# Patient Record
Sex: FEMALE | Race: WHITE | NOT HISPANIC OR LATINO | Employment: FULL TIME | URBAN - METROPOLITAN AREA
[De-identification: names, ages, dates, MRNs, and addresses within clinical notes are randomized per-mention and may not be internally consistent; named-entity substitution may affect disease eponyms.]

---

## 2021-03-12 ENCOUNTER — IMMUNIZATION (OUTPATIENT)
Dept: VACCINE CLINIC | Facility: HOSPITAL | Age: 51
End: 2021-03-12

## 2021-03-12 PROCEDURE — 91300 HC SARSCOV02 VAC 30MCG/0.3ML IM: CPT | Performed by: OBSTETRICS & GYNECOLOGY

## 2021-03-12 PROCEDURE — 0001A: CPT | Performed by: OBSTETRICS & GYNECOLOGY

## 2021-04-02 ENCOUNTER — IMMUNIZATION (OUTPATIENT)
Dept: VACCINE CLINIC | Facility: HOSPITAL | Age: 51
End: 2021-04-02

## 2021-04-02 PROCEDURE — 91300 HC SARSCOV02 VAC 30MCG/0.3ML IM: CPT | Performed by: OBSTETRICS & GYNECOLOGY

## 2021-04-02 PROCEDURE — 0002A: CPT | Performed by: OBSTETRICS & GYNECOLOGY

## 2022-10-10 ENCOUNTER — OFFICE VISIT (OUTPATIENT)
Dept: FAMILY MEDICINE CLINIC | Facility: CLINIC | Age: 52
End: 2022-10-10

## 2022-10-10 VITALS
RESPIRATION RATE: 16 BRPM | BODY MASS INDEX: 40.48 KG/M2 | TEMPERATURE: 97.2 F | HEART RATE: 101 BPM | WEIGHT: 220 LBS | HEIGHT: 62 IN | OXYGEN SATURATION: 98 % | DIASTOLIC BLOOD PRESSURE: 97 MMHG | SYSTOLIC BLOOD PRESSURE: 139 MMHG

## 2022-10-10 DIAGNOSIS — R74.8 ELEVATED LIVER ENZYMES: ICD-10-CM

## 2022-10-10 DIAGNOSIS — I10 PRIMARY HYPERTENSION: Primary | ICD-10-CM

## 2022-10-10 DIAGNOSIS — E55.9 VITAMIN D DEFICIENCY: ICD-10-CM

## 2022-10-10 DIAGNOSIS — Z12.31 ENCOUNTER FOR SCREENING MAMMOGRAM FOR MALIGNANT NEOPLASM OF BREAST: ICD-10-CM

## 2022-10-10 DIAGNOSIS — Z23 IMMUNIZATION DUE: ICD-10-CM

## 2022-10-10 DIAGNOSIS — Z11.59 ENCOUNTER FOR HEPATITIS C SCREENING TEST FOR LOW RISK PATIENT: ICD-10-CM

## 2022-10-10 PROCEDURE — 99204 OFFICE O/P NEW MOD 45 MIN: CPT | Performed by: NURSE PRACTITIONER

## 2022-10-10 PROCEDURE — 90686 IIV4 VACC NO PRSV 0.5 ML IM: CPT | Performed by: NURSE PRACTITIONER

## 2022-10-10 PROCEDURE — 90471 IMMUNIZATION ADMIN: CPT | Performed by: NURSE PRACTITIONER

## 2022-10-10 RX ORDER — LORATADINE 10 MG/1
10 TABLET ORAL DAILY
COMMUNITY
End: 2023-03-28

## 2022-10-10 RX ORDER — FLUTICASONE PROPIONATE 50 MCG
1 SPRAY, SUSPENSION (ML) NASAL DAILY
COMMUNITY

## 2022-10-10 RX ORDER — MELATONIN
1000 DAILY
COMMUNITY
End: 2023-01-06

## 2022-10-10 RX ORDER — LOSARTAN POTASSIUM 100 MG/1
100 TABLET ORAL DAILY
Qty: 90 TABLET | Refills: 1 | Status: SHIPPED | OUTPATIENT
Start: 2022-10-10 | End: 2023-03-28 | Stop reason: SDUPTHER

## 2022-10-10 NOTE — PROGRESS NOTES
Immunization  Immunization performed in LD by Joleen Chairez MA. Patient tolerated the procedure well without complications.  10/10/22   Joleen Chairez MA

## 2022-10-10 NOTE — PROGRESS NOTES
"Chief Complaint  Establish Care, Blood pressure , and Anxiety    Subjective          Christin presents to [facility]  as a 52 female to establish care and discuss some elevated blood pressures and increased anxiety.  She has had some elevated blood pressures in the past and was previously on medication several years ago however was able to get off this medication.  She was recently seen at the [facility] for COVID 19.  At that time her anxiety was very high and she did have a severely elevated blood pressure of 180/100 she denies any headaches no blurred vision no dizziness no flushing no chest pain or pressure.  She is agreeable to starting some medication today  .  Christin Llanos female 52 y.o., /97   Pulse 101   Temp 97.2 °F (36.2 °C) (Skin)   Resp 16   Ht 157.5 cm (62\")   Wt 99.8 kg (220 lb)   SpO2 98%   BMI 40.24 kg/m²   who presents today for follow up of Anxiety.  She reports fatigue, anxiety and excessive worry. Onset of symptoms was approximately several years ago.  She denies current suicidal and homicidal ideation. Risk factors are family history of anxiety and or depression, lifestyle of multiple roles, family situation/stress and prior diagnosis of anxiety.  Previous treatment includes medication (SSRI). She complains of the following medication side effects: none. The patient has had no previous counseling..  Most of her anxiety is coming from recent diagnosis of COVID 19.  He does not feel like she needs to restart any medication at this time.  It has improved as her symptoms have dissipated.  She believes she was on an SSRI in the past any medication side effects worked well for the month or 2 that she was on it.  When her stress decrease she was able to wean herself off of this medication and has not been on anything since that time.  She is not currently or previously in counseling.  She will follow-up if she feels like her symptoms are " "increasing    PHQ-2 Depression Screening  Little interest or pleasure in doing things? 0-->not at all   Feeling down, depressed, or hopeless? 0-->not at all   PHQ-2 Total Score 0         She has not had a mammogram and would like to have that ordered today.  Only family history she has is a maternal aunt with a recent diagnosis.  She has no complaints of    She is due for Pap smear-we will schedule that for her next 1 month follow-up visit    She does state that she has had some hypothyroidism in the past.  She quit taking the medication has not recently had any lab work    She is fasting and agreeable to labs today    She has no other significant medical history    She does want a  flu shot office today    The following portions of the patient's history were reviewed and updated as appropriate: allergies, current medications, past family history, past medical history, past social history, past surgical history, and problem list    Review of Systems   Constitutional: Positive for fatigue. Negative for chills and fever.   Eyes: Negative for visual disturbance.   Respiratory: Negative for cough, shortness of breath and wheezing.    Cardiovascular: Negative for chest pain, palpitations and leg swelling.   Gastrointestinal: Negative for abdominal pain, diarrhea, nausea and vomiting.   Skin: Negative for rash.   Neurological: Negative for dizziness and light-headedness.   Psychiatric/Behavioral: Negative for dysphoric mood, self-injury and suicidal ideas. The patient is not nervous/anxious.         Objective   Vital Signs:   Vitals:    10/10/22 0816   BP: 139/97   Pulse: 101   Resp: 16   Temp: 97.2 °F (36.2 °C)   TempSrc: Skin   SpO2: 98%   Weight: 99.8 kg (220 lb)   Height: 157.5 cm (62\")        Class 3 Severe Obesity (BMI >=40). Obesity-related health conditions include the following: none. Obesity is unchanged. BMI is is above average; BMI management plan is completed. We discussed portion control and increasing " exercise.        Physical Exam  Vitals reviewed.   Constitutional:       General: She is not in acute distress.  Eyes:      Conjunctiva/sclera: Conjunctivae normal.   Neck:      Thyroid: No thyromegaly.      Vascular: No carotid bruit.   Cardiovascular:      Rate and Rhythm: Normal rate and regular rhythm.      Heart sounds: Normal heart sounds.   Pulmonary:      Effort: Pulmonary effort is normal.      Breath sounds: Normal breath sounds.   Lymphadenopathy:      Cervical: No cervical adenopathy.   Neurological:      Mental Status: She is alert.   Psychiatric:         Attention and Perception: Attention normal.         Mood and Affect: Mood normal.          Result Review :     The following data was reviewed by: JT Brady on 10/10/2022:  No recent lab work         Assessment and Plan    Diagnoses and all orders for this visit:    1. Primary hypertension (Primary)  Comments:  Start losartan  Monitor blood pressure at home bring log to next visit  Follow-up 1 month  Orders:  -     losartan (COZAAR) 100 MG tablet; Take 1 tablet by mouth Daily.  Dispense: 90 tablet; Refill: 1  -     Comprehensive Metabolic Panel  -     CBC & Differential  -     Lipid Panel  -     TSH  -     T4, Free  -     Vitamin D 25 Hydroxy    2. Encounter for hepatitis C screening test for low risk patient  Comments:  One-time screening  Orders:  -     Hepatitis C Antibody    3. Immunization due  Comments:  Desires influenza vaccine today  No fever  Orders:  -     FluLaval/Fluzone >6 mos ([id number])    4. Encounter for screening mammogram for malignant neoplasm of breast  Comments:  screening-  no previous  no Complaints of    Orders:  -     Mammo Screening Bilateral With CAD; Future        Follow Up   Return in about 1 month (around 11/10/2022) for Annual physical with pap.  Patient was given instructions and counseling regarding her condition or for health maintenance advice. Please see specific information pulled into the AVS if  appropriate.

## 2022-10-11 LAB
25(OH)D3+25(OH)D2 SERPL-MCNC: 15 NG/ML (ref 30–100)
ALBUMIN SERPL-MCNC: 4.1 G/DL (ref 3.5–5.2)
ALBUMIN/GLOB SERPL: 1.5 G/DL
ALP SERPL-CCNC: 167 U/L (ref 39–117)
ALT SERPL-CCNC: 85 U/L (ref 1–33)
AST SERPL-CCNC: 66 U/L (ref 1–32)
BASOPHILS # BLD MANUAL: 0.05 10*3/MM3 (ref 0–0.2)
BASOPHILS NFR BLD MANUAL: 1 % (ref 0–1.5)
BILIRUB SERPL-MCNC: 0.5 MG/DL (ref 0–1.2)
BUN SERPL-MCNC: 5 MG/DL (ref 6–20)
BUN/CREAT SERPL: 7.6 (ref 7–25)
CALCIUM SERPL-MCNC: 9.2 MG/DL (ref 8.6–10.5)
CHLORIDE SERPL-SCNC: 98 MMOL/L (ref 98–107)
CHOLEST SERPL-MCNC: 198 MG/DL (ref 0–200)
CO2 SERPL-SCNC: 31.6 MMOL/L (ref 22–29)
CREAT SERPL-MCNC: 0.66 MG/DL (ref 0.57–1)
DIFFERENTIAL COMMENT: ABNORMAL
EGFRCR SERPLBLD CKD-EPI 2021: 104.4 ML/MIN/1.73
EOSINOPHIL # BLD MANUAL: 0.05 10*3/MM3 (ref 0–0.4)
EOSINOPHIL NFR BLD MANUAL: 1 % (ref 0.3–6.2)
ERYTHROCYTE [DISTWIDTH] IN BLOOD BY AUTOMATED COUNT: 11.7 % (ref 12.3–15.4)
GLOBULIN SER CALC-MCNC: 2.7 GM/DL
GLUCOSE SERPL-MCNC: 86 MG/DL (ref 65–99)
HCT VFR BLD AUTO: 49.9 % (ref 34–46.6)
HDLC SERPL-MCNC: 98 MG/DL (ref 40–60)
HGB BLD-MCNC: 17.4 G/DL (ref 12–15.9)
LDLC SERPL CALC-MCNC: 83 MG/DL (ref 0–100)
LYMPHOCYTES # BLD MANUAL: 0.95 10*3/MM3 (ref 0.7–3.1)
LYMPHOCYTES NFR BLD MANUAL: 18 % (ref 19.6–45.3)
MCH RBC QN AUTO: 37.4 PG (ref 26.6–33)
MCHC RBC AUTO-ENTMCNC: 34.9 G/DL (ref 31.5–35.7)
MCV RBC AUTO: 107.3 FL (ref 79–97)
MONOCYTES # BLD MANUAL: 0.64 10*3/MM3 (ref 0.1–0.9)
MONOCYTES NFR BLD MANUAL: 12 % (ref 5–12)
NEUTROPHILS # BLD MANUAL: 3.6 10*3/MM3 (ref 1.7–7)
NEUTROPHILS NFR BLD MANUAL: 68 % (ref 42.7–76)
NRBC BLD AUTO-RTO: 0 /100 WBC (ref 0–0.2)
PLATELET # BLD AUTO: 192 10*3/MM3 (ref 140–450)
PLATELET BLD QL SMEAR: ABNORMAL
POTASSIUM SERPL-SCNC: 5.5 MMOL/L (ref 3.5–5.2)
PROT SERPL-MCNC: 6.8 G/DL (ref 6–8.5)
RBC # BLD AUTO: 4.65 10*6/MM3 (ref 3.77–5.28)
RBC MORPH BLD: ABNORMAL
SODIUM SERPL-SCNC: 137 MMOL/L (ref 136–145)
T4 FREE SERPL-MCNC: 0.93 NG/DL (ref 0.93–1.7)
TRIGL SERPL-MCNC: 100 MG/DL (ref 0–150)
TSH SERPL DL<=0.005 MIU/L-ACNC: 3.85 UIU/ML (ref 0.27–4.2)
VLDLC SERPL CALC-MCNC: 17 MG/DL (ref 5–40)
WBC # BLD AUTO: 5.3 10*3/MM3 (ref 3.4–10.8)

## 2022-10-12 ENCOUNTER — PATIENT ROUNDING (BHMG ONLY) (OUTPATIENT)
Dept: FAMILY MEDICINE CLINIC | Facility: CLINIC | Age: 52
End: 2022-10-12

## 2022-10-12 NOTE — PROGRESS NOTES
My name is Alanna the practice manager.    I want to officially welcome you to our practice, and ask about your recent visit.    If you could tell me about your recent visit with us.  What things went well?    We are always looking for ways to make our patients' experience even better.  Do you have any recommendations on ways we can improve?    Overall were you satisfied with your first visit with our practice?    I appreciate you taking the time to answer a few questions today.        Thank you, and have a great day!

## 2022-11-10 ENCOUNTER — HOSPITAL ENCOUNTER (OUTPATIENT)
Dept: MAMMOGRAPHY | Facility: HOSPITAL | Age: 52
Discharge: HOME | End: 2022-11-10
Admitting: NURSE PRACTITIONER

## 2022-11-10 ENCOUNTER — OFFICE VISIT (OUTPATIENT)
Dept: FAMILY MEDICINE CLINIC | Facility: CLINIC | Age: 52
End: 2022-11-10

## 2022-11-10 VITALS
OXYGEN SATURATION: 98 % | HEART RATE: 94 BPM | HEIGHT: 62 IN | DIASTOLIC BLOOD PRESSURE: 98 MMHG | WEIGHT: 219 LBS | TEMPERATURE: 97 F | BODY MASS INDEX: 40.3 KG/M2 | SYSTOLIC BLOOD PRESSURE: 160 MMHG | RESPIRATION RATE: 16 BRPM

## 2022-11-10 DIAGNOSIS — I10 PRIMARY HYPERTENSION: ICD-10-CM

## 2022-11-10 DIAGNOSIS — Z12.31 ENCOUNTER FOR SCREENING MAMMOGRAM FOR MALIGNANT NEOPLASM OF BREAST: ICD-10-CM

## 2022-11-10 DIAGNOSIS — Z12.4 ENCOUNTER FOR PAPANICOLAOU SMEAR OF CERVIX: Primary | ICD-10-CM

## 2022-11-10 PROCEDURE — 99214 OFFICE O/P EST MOD 30 MIN: CPT | Performed by: NURSE PRACTITIONER

## 2022-11-10 PROCEDURE — 77067 SCR MAMMO BI INCL CAD: CPT

## 2022-11-10 PROCEDURE — 77063 BREAST TOMOSYNTHESIS BI: CPT

## 2022-11-10 RX ORDER — AMLODIPINE BESYLATE 5 MG/1
5 TABLET ORAL DAILY
Qty: 30 TABLET | Refills: 1 | Status: SHIPPED | OUTPATIENT
Start: 2022-11-10 | End: 2023-01-06

## 2022-11-10 NOTE — PROGRESS NOTES
"Chief Complaint  PAP     Subjective          Christin presents to [facility]  Is a 52-year-old female to follow-up on hypertension and for a annual Pap smear.  She was started on losartan last office visit she has been monitoring her blood pressures regularly.  They have been running 120s to 160s over 80 to 90s.  She denies any headaches no blurred vision no dizziness no flushing no chest pain or pressure.    She is due for a Pap smear.  She has had no vaginal bleeding no pelvic pain or discharge or any other complaints of.  She did have a mammogram done today also    She denies any other complaints of    The following portions of the patient's history were reviewed and updated as appropriate: allergies, current medications, past family history, past medical history, past social history, past surgical history, and problem list    Review of Systems   Constitutional: Negative for chills, fatigue and fever.   Eyes: Negative for visual disturbance.   Respiratory: Negative for cough, shortness of breath and wheezing.    Cardiovascular: Negative for chest pain, palpitations and leg swelling.   Gastrointestinal: Negative for abdominal pain, diarrhea, nausea, rectal pain and vomiting.   Genitourinary: Negative for menstrual problem, vaginal bleeding, vaginal discharge and vaginal pain.   Skin: Negative for rash.   Neurological: Negative for dizziness and light-headedness.        Objective   Vital Signs:   Vitals:    11/10/22 1052   BP: 160/98   Pulse: 94   Resp: 16   Temp: 97 °F (36.1 °C)   TempSrc: Skin   SpO2: 98%   Weight: 99.3 kg (219 lb)   Height: 157.5 cm (62\")                Physical Exam  Vitals reviewed. Exam conducted with a chaperone present.   Constitutional:       General: She is not in acute distress.  Eyes:      Conjunctiva/sclera: Conjunctivae normal.   Neck:      Thyroid: No thyromegaly.      Vascular: No carotid bruit.   Cardiovascular:      Rate and Rhythm: Normal rate and " regular rhythm.      Heart sounds: Normal heart sounds.   Pulmonary:      Effort: Pulmonary effort is normal.      Breath sounds: Normal breath sounds.   Genitourinary:     General: Normal vulva.   Neurological:      Mental Status: She is alert.   Psychiatric:         Attention and Perception: Attention normal.         Mood and Affect: Mood normal.          Result Review :     The following data was reviewed by: JT Brady on 11/10/2022:           Assessment and Plan    Diagnoses and all orders for this visit:    1. Encounter for Papanicolaou smear of cervix (Primary)  -     IGP,CtNg,AptimaHPV,rfx16 / 18,45    2. Primary hypertension  Comments:  continue  losartan  add 5 mg amlodipine  Monitor blood pressure at home bring log to next visit  Follow-up 1 month  Orders:  -     amLODIPine (NORVASC) 5 MG tablet; Take 1 tablet by mouth Daily.  Dispense: 30 tablet; Refill: 1        Follow Up   Return in about 1 month (around 12/10/2022) for Next scheduled follow up b/p check.  Patient was given instructions and counseling regarding her condition or for health maintenance advice. Please see specific information pulled into the AVS if appropriate.

## 2022-11-17 LAB
C TRACH RRNA CVX QL NAA+PROBE: NEGATIVE
CYTOLOGIST CVX/VAG CYTO: NORMAL
CYTOLOGY CVX/VAG DOC CYTO: NORMAL
CYTOLOGY CVX/VAG DOC THIN PREP: NORMAL
DX ICD CODE: NORMAL
HIV 1 & 2 AB SER-IMP: NORMAL
HPV GENOTYPE REFLEX: NORMAL
HPV I/H RISK 4 DNA CVX QL PROBE+SIG AMP: NEGATIVE
N GONORRHOEA RRNA CVX QL NAA+PROBE: NEGATIVE
OTHER STN SPEC: NORMAL
STAT OF ADQ CVX/VAG CYTO-IMP: NORMAL

## 2023-01-06 ENCOUNTER — OFFICE VISIT (OUTPATIENT)
Dept: FAMILY MEDICINE CLINIC | Facility: CLINIC | Age: 53
End: 2023-01-06
Payer: COMMERCIAL

## 2023-01-06 VITALS
WEIGHT: 227.4 LBS | DIASTOLIC BLOOD PRESSURE: 88 MMHG | HEIGHT: 62 IN | OXYGEN SATURATION: 98 % | HEART RATE: 95 BPM | TEMPERATURE: 96.9 F | SYSTOLIC BLOOD PRESSURE: 122 MMHG | BODY MASS INDEX: 41.85 KG/M2

## 2023-01-06 DIAGNOSIS — I10 PRIMARY HYPERTENSION: ICD-10-CM

## 2023-01-06 DIAGNOSIS — F41.9 ANXIETY: Primary | ICD-10-CM

## 2023-01-06 PROCEDURE — 99214 OFFICE O/P EST MOD 30 MIN: CPT | Performed by: NURSE PRACTITIONER

## 2023-01-06 RX ORDER — AMLODIPINE BESYLATE 5 MG/1
TABLET ORAL
Qty: 30 TABLET | Refills: 1 | Status: SHIPPED | OUTPATIENT
Start: 2023-01-06 | End: 2023-03-06

## 2023-01-06 RX ORDER — CITALOPRAM 20 MG/1
20 TABLET ORAL DAILY
Qty: 30 TABLET | Refills: 1 | Status: SHIPPED | OUTPATIENT
Start: 2023-01-06 | End: 2023-03-06

## 2023-01-06 NOTE — PROGRESS NOTES
Chief Complaint  Hypertension (126/95 117/84 132/96) and Anxiety    Subjective          Christin presents to [facility]     As a 52 year old female for follow up on elevated blood pressures and anxiety.    She has a history of a few elevated blood pressures when checking at home.  Has been controlle don 5 mg amlodipine.  No medication side effects.   She has had 120-140s /90s.  She has had a lot of increased stress and anxiety in the past several months with work and family illness.  She denies any increased HA, blurred vision, dizziness, flushing, no chest pain or pressure.    Good in office    Reports depressed mood and anxiety, excessive worry, unable to relax, panic feeling, irritable and sleep disturbance. Onset of symptoms was approximately a few months ago.  She denies current suicidal and homicidal ideation. Risk factors are family history of anxiety and or depression, lifestyle of multiple roles, family situation/stress, job stress, caring for elderly parent and prior diagnosis of anxiety.  Previous treatment includes took clexa for a couple months at the beginning of the pandemic-  worked well, no side effects. She complains of the following medication side effects: none. The patient has had no previous counseling.  PHQ-9 Depression Screening  Little interest or pleasure in doing things? 2-->more than half the days   Feeling down, depressed, or hopeless? 1-->several days   Trouble falling or staying asleep, or sleeping too much? 3-->nearly every day   Feeling tired or having little energy? 1-->several days   Poor appetite or overeating? 0-->not at all   Feeling bad about yourself - or that you are a failure or have let yourself or your family down? 1-->several days   Trouble concentrating on things, such as reading the newspaper or watching television? 1-->several days   Moving or speaking so slowly that other people could have noticed? Or the opposite - being so fidgety or  restless that you have been moving around a lot more than usual? 0-->not at all   Thoughts that you would be better off dead, or of hurting yourself in some way? 0-->not at all   PHQ-9 Total Score 9   If you checked off any problems, how difficult have these problems made it for you to do your work, take care of things at home, or get along with other people?  somewhat difficult           She denies any other problems today    The following portions of the patient's history were reviewed and updated as appropriate: allergies, current medications, past family history, past medical history, past social history, past surgical history, and problem list      Review of Systems   Constitutional: Negative for chills, fatigue and fever.   Eyes: Negative for visual disturbance.   Respiratory: Negative for cough, shortness of breath and wheezing.    Cardiovascular: Negative for chest pain, palpitations and leg swelling.   Gastrointestinal: Negative for abdominal pain, diarrhea, nausea and vomiting.   Skin: Negative for rash.   Neurological: Negative for dizziness and light-headedness.   Psychiatric/Behavioral: Positive for sleep disturbance. Negative for self-injury and suicidal ideas. The patient is nervous/anxious.         Objective   Vital Signs:   Vitals:    01/06/23 0805   BP: 122/88   BP Location: Left arm   Patient Position: Sitting   Cuff Size: Adult   Pulse: 95   Temp: 96.9 °F (36.1 °C)   TempSrc: Temporal   SpO2: 98%   Weight: 103 kg (227 lb 6.4 oz)   Height: 157.5 cm (62.01\")        Class 3 Severe Obesity (BMI >=40). Obesity-related health conditions include the following: hypertension and dyslipidemias. Obesity is unchanged. BMI is is above average; BMI management plan is completed. We discussed portion control and increasing exercise.        Physical Exam  Vitals reviewed.   Constitutional:       General: She is not in acute distress.  Eyes:      Conjunctiva/sclera: Conjunctivae normal.   Neck:      Thyroid: No  thyromegaly.      Vascular: No carotid bruit.   Cardiovascular:      Rate and Rhythm: Normal rate and regular rhythm.      Heart sounds: Normal heart sounds.   Pulmonary:      Effort: Pulmonary effort is normal.      Breath sounds: Normal breath sounds.   Neurological:      Mental Status: She is alert.   Psychiatric:         Attention and Perception: Attention normal.         Mood and Affect: Mood is anxious.          Result Review :     The following data was reviewed by: JT Brady on 01/06/2023:  Comprehensive Metabolic Panel (10/10/2022 09:02)  CBC & Differential (10/10/2022 09:02)  Lipid Panel (10/10/2022 09:02)  TSH (10/10/2022 09:02)  T4, Free (10/10/2022 09:02)  Vitamin D 25 Hydroxy (10/10/2022 09:02)  Manual Differential (10/10/2022 09:02)      Liver enzymes elevated-avoid alcohol and Tylenol recheck 3 months  Vitamin D is low we will send weekly supplementation to pharmacy     Potassium is slightly elevated-blood test show some hemolysis we will repeat when checking liver enzyme     Assessment and Plan    Diagnoses and all orders for this visit:    1. Anxiety (Primary)  Comments:  restart celexa  good support-  reviewed first 2 weeks may have increased symptoms.  Denies any Si/HI  phq9-9  Orders:  -     citalopram (CeleXA) 20 MG tablet; Take 1 tablet by mouth Daily for 180 days.  Dispense: 30 tablet; Refill: 1    2. Primary hypertension  Comments:  continue current medication.  monitor b/p at home and bring log to next visit    need to recheck CBC, CMP and Vitamin D-  Labs already placed    Follow Up   Return in about 1 month (around 2/6/2023) for Next scheduled follow up anxiety.  Patient was given instructions and counseling regarding her condition or for health maintenance advice. Please see specific information pulled into the AVS if appropriate.

## 2023-01-07 NOTE — TELEPHONE ENCOUNTER
Rx Refill Note  Requested Prescriptions     Pending Prescriptions Disp Refills   • amLODIPine (NORVASC) 5 MG tablet [Pharmacy Med Name: amLODIPine BESYLATE 5 MG TAB] 30 tablet 1     Sig: TAKE ONE TABLET BY MOUTH DAILY      Last office visit with prescribing clinician: 1/6/2023   Last telemedicine visit with prescribing clinician: Visit date not found   Next office visit with prescribing clinician: Visit date not found

## 2023-03-04 DIAGNOSIS — I10 PRIMARY HYPERTENSION: ICD-10-CM

## 2023-03-04 DIAGNOSIS — F41.9 ANXIETY: ICD-10-CM

## 2023-03-06 RX ORDER — AMLODIPINE BESYLATE 5 MG/1
TABLET ORAL
Qty: 30 TABLET | Refills: 0 | Status: SHIPPED | OUTPATIENT
Start: 2023-03-06 | End: 2023-03-28 | Stop reason: SDUPTHER

## 2023-03-06 RX ORDER — CITALOPRAM 20 MG/1
TABLET ORAL
Qty: 30 TABLET | Refills: 0 | Status: SHIPPED | OUTPATIENT
Start: 2023-03-06 | End: 2023-03-28 | Stop reason: SDUPTHER

## 2023-03-06 NOTE — TELEPHONE ENCOUNTER
Rx Refill Note  Requested Prescriptions     Pending Prescriptions Disp Refills   • amLODIPine (NORVASC) 5 MG tablet [Pharmacy Med Name: amLODIPine BESYLATE 5 MG TAB] 30 tablet 1     Sig: TAKE ONE TABLET BY MOUTH DAILY   • citalopram (CeleXA) 20 MG tablet [Pharmacy Med Name: CITALOPRAM HBR 20 MG TABLET] 30 tablet 1     Sig: TAKE ONE TABLET BY MOUTH DAILY      Last office visit with prescribing clinician: 1/6/2023   Last telemedicine visit with prescribing clinician: Visit date not found   Next office visit with prescribing clinician: Visit date not found       Sent 30 days to the pharmacy pt need an appt  Ok for hub to relay a massege

## 2023-03-28 ENCOUNTER — OFFICE VISIT (OUTPATIENT)
Dept: FAMILY MEDICINE CLINIC | Facility: CLINIC | Age: 53
End: 2023-03-28
Payer: COMMERCIAL

## 2023-03-28 VITALS
HEART RATE: 86 BPM | SYSTOLIC BLOOD PRESSURE: 118 MMHG | OXYGEN SATURATION: 99 % | DIASTOLIC BLOOD PRESSURE: 72 MMHG | BODY MASS INDEX: 41.14 KG/M2 | WEIGHT: 225 LBS

## 2023-03-28 DIAGNOSIS — I10 PRIMARY HYPERTENSION: Primary | ICD-10-CM

## 2023-03-28 DIAGNOSIS — E55.9 VITAMIN D DEFICIENCY: ICD-10-CM

## 2023-03-28 DIAGNOSIS — F41.9 ANXIETY: ICD-10-CM

## 2023-03-28 RX ORDER — LOSARTAN POTASSIUM 100 MG/1
100 TABLET ORAL DAILY
Qty: 90 TABLET | Refills: 2 | Status: SHIPPED | OUTPATIENT
Start: 2023-03-28

## 2023-03-28 RX ORDER — CITALOPRAM 20 MG/1
20 TABLET ORAL DAILY
Qty: 90 TABLET | Refills: 2 | Status: SHIPPED | OUTPATIENT
Start: 2023-03-28 | End: 2023-09-24

## 2023-03-28 RX ORDER — AMLODIPINE BESYLATE 5 MG/1
5 TABLET ORAL DAILY
Qty: 90 TABLET | Refills: 2 | Status: SHIPPED | OUTPATIENT
Start: 2023-03-28 | End: 2023-09-24

## 2023-03-28 NOTE — PROGRESS NOTES
Chief Complaint  Hypertension and Anxiety    Subjective          Christin presents to [facility]   As a 52 year old female to follow up and receive refills.  Overall doing well    Hypertension-  Controlled on current medication.  Denies any HA, blurred vision, dizzziness, flushing, no chest pain or pressure     She  also presents today for follow up of Anxiety.  She reports medication is working well, patient desires to continue on Rx, and needs refill. Onset of symptoms was approximately several years ago.  She denies current suicidal and homicidal ideation. Risk factors are family history of anxiety and or depression, lifestyle of multiple roles, family situation/stress, job stress and prior diagnosis of anxiety.  Previous treatment includes current Rx. She complains of the following medication side effects: none. The patient has had no previous counseling.  Medication working well-  Reports good improvement-  Plans to continue at current dose  PHQ-2 Depression Screening  Little interest or pleasure in doing things?  0   Feeling down, depressed, or hopeless?  0   PHQ-2 Total Score  0         She has no other acute C/o today    The following portions of the patient's history were reviewed and updated as appropriate: allergies, current medications, past family history, past medical history, past social history, past surgical history, and problem list      Review of Systems   Constitutional: Negative for chills, fatigue and fever.   Eyes: Negative for visual disturbance.   Respiratory: Negative for cough, shortness of breath and wheezing.    Cardiovascular: Negative for chest pain, palpitations and leg swelling.   Gastrointestinal: Negative for abdominal pain, diarrhea, nausea and vomiting.   Neurological: Negative for dizziness and light-headedness.        Objective   Vital Signs:   Vitals:    03/28/23 0811   BP: 118/72   Pulse: 86   SpO2: 99%   Weight: 102 kg (225 lb)        Class 3  Severe Obesity (BMI >=40). Obesity-related health conditions include the following: hypertension and dyslipidemias. Obesity is unchanged. BMI is is above average; BMI management plan is completed. We discussed portion control and increasing exercise.        Physical Exam  Vitals reviewed.   Constitutional:       General: She is not in acute distress.  Eyes:      Conjunctiva/sclera: Conjunctivae normal.   Neck:      Thyroid: No thyromegaly.      Vascular: No carotid bruit.   Cardiovascular:      Rate and Rhythm: Normal rate and regular rhythm.      Heart sounds: Normal heart sounds.   Pulmonary:      Effort: Pulmonary effort is normal. No respiratory distress.      Breath sounds: Normal breath sounds. No stridor. No wheezing, rhonchi or rales.   Chest:      Chest wall: No tenderness.   Lymphadenopathy:      Cervical: No cervical adenopathy.   Neurological:      Mental Status: She is alert.   Psychiatric:         Attention and Perception: Attention normal.         Mood and Affect: Mood normal.          Result Review :     The following data was reviewed by: JT Brady on 03/28/2023:           Assessment and Plan    Diagnoses and all orders for this visit:    1. Primary hypertension (Primary)  Comments:  controlled-  continue current managment  Monitor blood pressure at home bring log to next visit    Orders:  -     amLODIPine (NORVASC) 5 MG tablet; Take 1 tablet by mouth Daily for 180 days.  Dispense: 90 tablet; Refill: 2  -     losartan (COZAAR) 100 MG tablet; Take 1 tablet by mouth Daily.  Dispense: 90 tablet; Refill: 2    2. Anxiety  Comments:  controlled continue current managment  improved-  doing well on current dose  Denies any Si/HI  phq2-0  Orders:  -     citalopram (CeleXA) 20 MG tablet; Take 1 tablet by mouth Daily for 180 days.  Dispense: 90 tablet; Refill: 2    3. Vitamin D deficiency  Comments:  continue weekly supplements-  recheck with next labs  Orders:  -     Cholecalciferol 1.25 MG  (15944 UT) tablet; One PO once weekly  Dispense: 5 tablet; Refill: 11        Follow Up   Return in about 6 months (around 9/28/2023) for Annual physical.  Patient was given instructions and counseling regarding her condition or for health maintenance advice. Please see specific information pulled into the AVS if appropriate.

## 2023-08-22 ENCOUNTER — APPOINTMENT (OUTPATIENT)
Facility: HOSPITAL | Age: 53
End: 2023-08-22
Payer: COMMERCIAL

## 2023-08-22 PROCEDURE — 73610 X-RAY EXAM OF ANKLE: CPT

## 2023-10-06 ENCOUNTER — E-VISIT (OUTPATIENT)
Dept: FAMILY MEDICINE CLINIC | Facility: TELEHEALTH | Age: 53
End: 2023-10-06
Payer: COMMERCIAL

## 2023-10-06 PROCEDURE — BRIGHTMDVISIT: Performed by: NURSE PRACTITIONER

## 2023-10-06 NOTE — E-VISIT TREATED
Chief Complaint: Rashes and other skin conditions   Patient introduction   Patient is 53-year-old female presenting with pruritic, painful unilateral rash on their chest for 24 to 48 hours.   Rash is warm to the touch.   General presentation   Patient has not had any recent cold symptoms. No fever, chills, myalgia, nausea, vomiting, diarrhea, headache, or fatigue/lethargy.   Has not tried any treatments for current rash symptoms.   Patient has no previous history of a similar rash.   Eczema: Patient has history of atopic conditions.   Insect bites: No known recent insect exposure.   Chickenpox: Has had chickenpox.   Scabies: No recent scabies exposure.   Impetigo: No recent impetigo exposure.   Ticks: Patient has not recently spent significant time outdoors. In previous month, patient has not spent any time in regions with a high risk of tick-borne disease.   Appearance or location of rash does not change throughout the course of a day. Pruritus described as moderate and is worsening. Itching doesn't affect daily activities. Itching makes it difficult to sleep.   Rash has spread to a new location.   No herald patch prior to onset of rash.   Rash is not in an area that is regularly shaved. Patient does not participate in contact sports. Patient does not work in a school or childcare facility.   No history of prior MRSA infection.   No known aggravators.   Review of red flags/alarm symptoms:    No systemic symptoms    No symptoms of anaphylactic reaction    No swollen lymph nodes    No recent history suggesting adverse drug rash (no new medication, herb, or supplement)   Pregnancy/breastfeeding: Patient is postmenopausal.   Current medications   Currently taking XYZAL PO, losartan 100 MG tablet, Cholecalciferol 1.25 MG (87508 UT) tablet, and citalopram 20 MG tablet.   Medication allergies   No relevant drug allergies.   Medication contraindication review   Patient is currently taking an ARB. Therefore, medications  containing trimethoprim will not be prescribed.   No cerebral malaria, CHF, cutaneous okqlv-fjydzh-kwwh disease, folate deficiency, G6PD deficiency (or breastfeeding a child with G6PD deficiency), generalized erythroderma, liver disease, lamellar ichthyosis, malignancy or premalignancy at the affected site, megaloblastic anemia, mononucleosis, Netherton syndrome, peripheral neuropathy, porphyria, QT prolongation, congenital long QT syndrome, skin barrier defect condition, systemic mycoses, TMP/SMX-associated thrombocytopenia, thyroid dysfunction, or ulcerative colitis.   No history of myasthenia gravis, aortic aneurysm or dissection, Marfan syndrome, or Stephanie-Danlos syndrome.   Past medical history   Immune conditions: No immunocompromising conditions.   No history of cancer.   Patient did not request an excuse note.   Assessment   Herpes zoster (Shingles).   This is the likely diagnosis based on interview responses and patient-submitted photos, including:    Symptom profile    History of chickenpox   Plan   Medications:    valacyclovir 1 gram tablet RX 1000mg 1 tab PO tid 7d until the full prescribed amount is finished. Amount is 21 tab.   The patient's prescription will be sent to:   [facility] [id number]   [address]  [address] [place] [address]   Phone: [phone]     Fax: [phone]   Education:    Condition and causes    Prevention    Treatment and self-care    When to call provider   ----------   Electronically signed by JT Pete on 2023-10-06 at 10:24AM   ----------   Patient Interview Transcript:   Where is the affected area located? Select all that apply.    Chest   Not selected:    Scalp    Face    Inside mouth or on lips    Neck    Arm    Hand    Stomach    Back    Buttocks    Groin    Leg    Foot or in between toes    None of the above   Before your skin symptoms appeared, were you exposed to any of these possible triggers? Select all that apply.    None of the  above   Not selected:    Tick bite    Other insect bite or sting in the affected area    Dog or cat bite    Cut, scrape, or other skin injury in the affected area    Contact with poison oak, poison ivy, or poison sumac in the affected area    Contact with a new soap, perfume, skincare product, cleaning product, or other chemical in the affected area    Wearing new jewelry, belt buckle, or other metal accessory in the affected area    Taking a new medication, supplement, or herb    Consuming a new food or drink    Vaccine injection    Exposure to extreme hot or cold temperatures    Exercising intensely    Sitting in a hot tub or swimming in a heated swimming pool    Wearing ill-fitting shoes or socks for a long time    Contact with someone who has a similar rash    Contact with someone who has impetigo    Contact with someone who has scabies    Other (specify)   Are your skin symptoms on one or both sides of your body? Select one.    One side only   Not selected:    Both sides   Have you ever had chickenpox? Select one.    Yes   Not selected:    No    I'm not sure   Is the affected skin in an area that you shave regularly? Select one.    No   Not selected:    Yes   Does the appearance or location of your skin symptoms change throughout the course of a day? For example, does it appear on one part of your body in the morning, disappear completely after several hours, and then reappear on a different part of your body? Select one.    No   Not selected:    Yes   Since your skin symptoms first appeared, have they spread or shown up in new locations? This includes covering a larger area than they first did, or spreading to another part of the body. Select one.    Yes   Not selected:    No   Which of these describe the affected area? Select all that apply.    Itchy    Painful    Warmer than other areas of my skin   Not selected:    None of the above   How intense is the itching? Select one.    Moderate   Not selected:     Mild    Severe    Unbearable   Has the itching gotten better, worse, or stayed the same? Select one.    Worse   Not selected:    Better    Same   Has the itching made daily activities difficult? Select one.    No   Not selected:    Yes   Has the itching made it difficult to sleep? Select one.    Yes   Not selected:    No   How long have you had these current skin symptoms? Select one.    24 to 48 hours   Not selected:    Less than 24 hours    2 to 3 days    3 to 5 days    5 to 7 days    1 to 2 weeks    More than 2 weeks   Do any of these make your symptoms worse? Select all that apply.    None of the above   Not selected:    Alcohol    Exercise    Exposure to very hot or very cold temperature    Certain foods    Changes in weather    , soaps, or detergents    Hot beverages    Spicy foods    Stress or strong emotions (such as anger or embarrassment)    Sun exposure    Sweat    Wool in clothing or blankets   To recommend the best treatment for you, we need to see photos of the affected area.   [image: /StartDate [email or web address]-rash-closeup.[email or web address]]   Close-up detail (for size, shape, and color)   [image: /StartDate Labssethtic/02-rash-location.png]   Surrounding area (to compare with healthy skin)   [image: /StartDate Labstatic/01-rash-distance.png]   Affected area at a distance (for scale and location)   If you choose not to send photos, you'll need to speak with a provider to get care.    Select one.    OK, I'll send photos.   Not selected:    I'd rather not send photos. Show me my care options.   Send at least 3 photos for review - Don't use a flash. - Make sure the photos are in focus. - Take a close-up photo (for size, shape, color). - Take a photo showing surrounding area (to compare with healthy skin). - Take a photo with a quarter coin or ruler near the affected area to show scale. - If more than one location is affected, repeat for each location.    Upload 1    Upload 2    Upload 3   Not selected:    Upload 4    Upload 5   A  rash can be a sign of a more serious condition. These conditions may need in-person care for an exam or lab tests. Along with your skin symptoms, have you had any of these symptoms? Select all that apply.    None of these   Not selected:    Fever    Chills    Body aches or muscle aches    Nausea    Vomiting    Diarrhea    Headache    Fatigue, exhaustion, or lethargy (feeling drowsy, dull, or low energy)   Have you had swollen lymph nodes? Swollen lymph nodes are small lumps under the skin that are soft, tender, and often painful. They may be noticed in the neck, the armpits, or the groin. Select one.    No, not that I've noticed   Not selected:    Yes   Along with your skin symptoms, have you had any of these symptoms? Select all that apply.    None of these   Not selected:    Chest pains or rapid heartbeat    Shortness of breath or wheezing    Swelling of lips or tongue    Throat tightness or hoarse voice    Stomach cramps, diarrhea, or vomiting    Confusion or dizziness   Have you recently had any cold symptoms (runny nose, nasal congestion, cough, or sore throat)? Select one.    No   Not selected:    Yes   Before the rash appeared, did you see a large, round patch on your chest, stomach, or back? This patch is usually 1 to 4 inches across, dry, and itchy. It often appears a few days to a few weeks before the rash. Select one.    No   Not selected:    Yes   Were you recently exposed to any insects? For example: - Do you have any household pets that may have fleas? - Have you noticed an increase in spiders, either indoors or outdoors? - Have you slept where bedbugs may be present? - Have you hiked, camped, or gardened where mosquitoes may have been present?    No, not that I know of   Not selected:    Yes   In the last month, did you spend a lot of time outdoors, especially in wooded areas with brushy fields or tall grasses? Select one.    No   Not selected:    Yes   In the last month, have you been to any of these  [facility]? Scroll to see all options. Select all that apply.    No   Not selected:    [facility]    [facility]   Have you had these skin symptoms before? Select one.    No   Not selected:    At least once before    Many times before    I'm not sure   Do you or does anyone in your family have a history of allergies (hay fever, seasonal allergies), eczema, or asthma? Select all that apply.    Yes, I do   Not selected:    Yes, a family member does    No, not that I know of   Have you ever been treated for a MRSA (methicillin-resistant Staphylococcus aureus) infection? MRSA is a type of bacteria that's resistant to many commonly used antibiotics. Select one.    No, not that I know of   Not selected:    Yes   Do you have any of these conditions? Scroll to see all options. Select all that apply.    None of the above   Not selected:    Cerebral malaria    Congenital long QT syndrome    Folate deficiency    Systemic fungal infection, such as invasive candidiasis    G6PD deficiency, or breastfeeding a child with G6PD deficiency    Infection at the affected area    Megaloblastic anemia    Mono (mononucleosis)    Decreased sensation in feet (peripheral neuropathy)    Porphyria    Skin cancer or pre-malignancy at the affected area    A serious skin condition (skin barrier defect condition, Netherton syndrome, lamellar ichthyosis, generalized erythroderma, or cutaneous xpqkh-lslpdk-rwul disease)    QT prolongation    Thyroid dysfunction    Ulcerative colitis   Do you have any of these conditions that can affect the immune system? Scroll to see all options. Select all that apply.    None of these   Not selected:    History of bone marrow transplant    Chronic kidney disease    Chronic liver disease (including cirrhosis)    HIV/AIDS     Inflammatory bowel disease (Crohn's disease or ulcerative colitis)    Lupus    Moderate to severe plaque psoriasis    Multiple sclerosis    Rheumatoid arthritis    Sickle cell anemia    Alpha or beta thalassemia    History of solid organ transplant (kidney, liver, or heart)    History of spleen removal    An autoimmune disorder not listed here (specify)    A condition requiring treatment with long-term use of oral steroids (such as prednisone, prednisolone, or dexamethasone) (specify)   Have you ever been diagnosed with cancer? Select one.    No   Not selected:    Yes, I have cancer now    Yes, but I'm in remission   Do you have any of these conditions? Scroll to see all options. Select all that apply.    None of the above   Not selected:    Myasthenia gravis    History of aortic aneurysm or dissection    Marfan syndrome    Stephanie-Danlos syndrome   Are you currently being treated for type 1 or type 2 diabetes? Select one.    No   Not selected:    Yes   Do you play sports or take part in activities with skin-to-skin contact? Select one.    No   Not selected:    Yes   Do you work in a school or childcare center? Select one.    No   Not selected:    Yes   Have you gone through menopause? Select one.    Yes   Not selected:    No    I'm going through it now   Have you tried any treatments for your current symptoms? Select one.    No   Not selected:    Yes   Are you taking any of these medications? Select all that apply.    An angiotensin II receptor blocker (ARB), such as candesartan, irbesartan, losartan, or valsartan   Not selected:    An ACE inhibitor, such as lisinopril, enalapril, captopril, or benazepril    Kynmobi or Apokyn (apomorphine)    No   Are you still taking these medications listed in your medical record? If you're not taking any of these, click Next. Select all that apply.    XYZAL PO    losartan 100 MG tablet    Cholecalciferol 1.25 MG (03929 UT) tablet    citalopram 20 MG tablet   Are you taking any  "other medications, vitamins, or supplements? Select one.    No   Not selected:    Yes   Have you ever had an allergic or bad reaction to any medication? Select one.    Yes   Not selected:    No   Have you had an allergic or bad reaction to any of these antibiotic medications? Select all that apply.    No, not that I know of   Not selected:    Penicillin or any \"-cillin\" antibiotic, such as amoxicillin, ampicillin, dicloxacillin, nafcillin, or piperacillin (Brands include Augmentin, Unasyn, and Zosyn)    Tetracycline or any \"-cycline\" antibiotic, such as doxycycline, demeclocycline, or minocycline (Brands include Doryx, Dynacin, Oracea, Monodox, and Vibramycin)    Ciprofloxacin or any \"-floxacin\" antibiotic, such as gemifloxacin, levofloxacin, moxifloxacin, or ofloxacin (Brands include Factive, Cipro, Floxin, and Levaquin)    Cephalexin or any \"cef-\" antibiotic, such as cefazolin, cefdinir, cefuroxime, ceftriaxone, ceftazidime, or cefepime (Brands include Fortaz and Keflex)    Clindamycin or lincomycin (Brands include Cleocin and Lincocin)    Sulfa drugs, such as sulfamethoxazole, sulfisoxazole, sulfasalazine, or dapsone (Brands include Aczone, Bactrim, and Septra)   Have you had an allergic or bad reaction to any of these medications? Select all that apply.    No, not that I know of   Not selected:    Antifungals, such as clotrimazole, fluconazole, ketoconazole, miconazole, or itraconazole, (Diflucan, Extina, Lotrimin-AF, Micatin, Onmel, Zeasorb)    Antiparasitics, such as Permethrin or lindane (Nix)    Antivirals, such as acyclovir, penciclovir, or valacyclovir (Valtrex, Zovirax)    Griseofulvin (Adriana-PEG)    Terbinafine (Lamisil)   Have you had an allergic or bad reaction to any corticosteroids? - Examples of topical corticosteroids include: hydrocortisone (Cortizone), clobetasol (Temovate, Cormax), betamethasone (Diprolene), fluocinonide (Vanos), desonide (Desowen, Desonate, Tridesilon), triamcinolone (Kenalog, " Trianex), alclometasone, and mometasone (Elocon). - Examples of oral corticosteroids include: prednisone and methylprednisolone (Medrol). Select one.    No, not that I know of   Not selected:    Yes   Have you had an allergic or bad reaction to any of these topical medications? A topical medication is a cream, gel, or ointment that's put on the skin. Select all that apply.    No, not that I know of   Not selected:    Mupirocin (Bactroban)    Topical retinoids, such as adapalene, azelaic acid, tazarotene, or tretinoin (Azelex, Differin, Finacea, or Tazorac)    Pimecrolimus or tacrolimus (Elidel or Protopic)    Crisaborole (Eucrisa)   Have you had an allergic or bad reaction to any of these medications? Select all that apply.    No, not that I know of   Not selected:    Acetaminophen (Tylenol)    Ibuprofen (Advil, Motrin, Midol)    Diphenhydramine (Benadryl)    Cetirizine (Zyrtec)    Hydroxyzine pamoate (Vistaril)    Loratadine (Alavert, Claritin)    Fexofenadine (Allegra)    Ammonium lactate lotion (Amlactin, Lac-Hydrin, Laclotion, Ultravate)    Salicylic acid cream (Salacyn, Salex)    Urea cream (Aqua Care, Nutraplus)    Calcium acetate/aluminum sulfate (Domeboro)   Have you had an allergic or bad reaction to ondansetron (Zuplenz, Zofran ODT, Zofran)? Select one.    No, not that I know of   Not selected:    Yes   Have you ever had jaundice or liver problems as a result of taking amoxicillin-clavulanate (Augmentin)? Jaundice is a condition in which the skin and the whites of the eyes turn yellow. Select all that apply.    No, not that I know of   Not selected:    Yes, jaundice    Yes, liver problems   Do you need a doctor's note? A doctor's note confirms that you received care today and states when you can return to school or work. It does not contain information about your diagnosis or treatment plan. Your provider will make the final decision on whether to give you a doctor's note. Doctor's notes cannot be  backdated. Select one.    No   Not selected:    Today only (1 day)    Today and tomorrow (2 days)    3 days   Is there anything you'd like to add about your symptoms? Please limit your comments to the symptoms asked about in this interview. If you include comments about other concerns, your provider may recommend that you be seen in person.   The patient did not enter any additional information.   ----------   Medical history   Medical history data does not currently exist for this patient.

## 2023-10-06 NOTE — EXTERNAL PATIENT INSTRUCTIONS
Diagnosis   Shingles (herpes zoster)   My name is Riana LizJT nielsen, and I'm a [facility] provider at [facility] After reviewing your responses and photos, I see that you have shingles, a painful rash caused by the same virus that causes chickenpox.   Medications   Your pharmacy   [facility] [id number] [address] [place] [address] [phone]     Prescription   Valacyclovir oral tablet (1000mg): Take 1 tablet by mouth 3 times a day for 7 days until the full prescribed amount is finished.   About your diagnosis   Shingles is a reactivation of the chickenpox virus. It's most common in people 50 years of age or older. However, anyone who has had chickenpox or the chickenpox vaccine can develop shingles.   It's NOT possible to get shingles from someone who has shingles. However, a person who's never had chickenpox or the chickenpox vaccine can get chickenpox from someone who has shingles. To decrease the risk of exposure, cover the rash and wash your hands often.   Common signs and symptoms of shingles include:    A painful rash, usually in a band-like pattern on one side of the body or face.    Itching, burning, pain, or tingling before the rash appeared. Rashes or blisters usually appear from 1 to 14 days after these early symptoms.   There is no cure for shingles. However, quick treatment can improve symptoms, manage pain, and speed healing.   What to expect   Shingles treatment is most effective if started within 72 hours (3 days) after the rash first appeared. It's good you completed an interview when you did. In general, symptoms of shingles may last from 2 to 6 weeks.   It's possible to get shingles more than once in a lifetime.   When to seek care   Call us at [phone]   with any sudden or unexpected symptoms.    The pain continues long after your blisters have cleared.    The rash spreads to your eyes.    The rash spreads to your ears.    The rash becomes very  tender, warm, or red, suggesting a secondary skin infection.   Other treatment    Take a cool bath with baking soda or oatmeal bath products added to the water. A cool compress applied to the rash can also help. Gently pat yourself dry with a towel. Take care not to accidentally rub or scrape the rash.    Trim your fingernails and try to avoid scratching. Scratching can lead to a secondary bacterial skin infection and scarring.    Try to manage stress. Exercise regularly and set aside time to relax.   Prevention   There is a shingles vaccine available to reduce the chances of getting shingles. Even if you've already had shingles, you can still get vaccinated to prevent future cases. In the [place], two shingles vaccines are available for people over the age of 50. Speak with your primary care provider to discuss which vaccine is best for you.   If you develop shingles again, get care as soon as you have symptoms. Early treatment can lessen the severity of symptoms and lower your risk of complications.   Your provider   Your diagnosis was provided by JT Pete, a member of your trusted care team at [facility].   If you have any questions, call us at [phone]  .

## 2023-12-16 DIAGNOSIS — F41.9 ANXIETY: ICD-10-CM

## 2023-12-16 DIAGNOSIS — I10 PRIMARY HYPERTENSION: ICD-10-CM

## 2023-12-18 NOTE — TELEPHONE ENCOUNTER
LAST OV  3/28/2023 FAYE  -  Return in about 6 months (around 9/28/2023) for Annual physical.  SENT TO [facility] VIA ON CALL

## 2023-12-19 RX ORDER — AMLODIPINE BESYLATE 5 MG/1
5 TABLET ORAL DAILY
Qty: 30 TABLET | Refills: 0 | Status: SHIPPED | OUTPATIENT
Start: 2023-12-19

## 2023-12-19 RX ORDER — LOSARTAN POTASSIUM 100 MG/1
100 TABLET ORAL DAILY
Qty: 30 TABLET | Refills: 0 | Status: SHIPPED | OUTPATIENT
Start: 2023-12-19

## 2023-12-19 RX ORDER — CITALOPRAM 20 MG/1
20 TABLET ORAL DAILY
Qty: 30 TABLET | Refills: 0 | Status: SHIPPED | OUTPATIENT
Start: 2023-12-19

## 2024-01-27 DIAGNOSIS — I10 PRIMARY HYPERTENSION: ICD-10-CM

## 2024-01-27 DIAGNOSIS — F41.9 ANXIETY: ICD-10-CM

## 2024-01-29 DIAGNOSIS — I10 PRIMARY HYPERTENSION: ICD-10-CM

## 2024-01-29 RX ORDER — CITALOPRAM 20 MG/1
TABLET ORAL
Qty: 30 TABLET | Refills: 0 | Status: SHIPPED | OUTPATIENT
Start: 2024-01-29 | End: 2024-02-02 | Stop reason: SDUPTHER

## 2024-01-29 RX ORDER — LOSARTAN POTASSIUM 100 MG/1
100 TABLET ORAL DAILY
Qty: 30 TABLET | Refills: 0 | Status: SHIPPED | OUTPATIENT
Start: 2024-01-29 | End: 2024-02-02 | Stop reason: SDUPTHER

## 2024-01-29 RX ORDER — AMLODIPINE BESYLATE 5 MG/1
TABLET ORAL
Qty: 30 TABLET | Refills: 0 | Status: SHIPPED | OUTPATIENT
Start: 2024-01-29 | End: 2024-02-02 | Stop reason: SDUPTHER

## 2024-02-02 ENCOUNTER — OFFICE VISIT (OUTPATIENT)
Dept: FAMILY MEDICINE CLINIC | Facility: CLINIC | Age: 54
End: 2024-02-02
Payer: COMMERCIAL

## 2024-02-02 VITALS
WEIGHT: 232 LBS | SYSTOLIC BLOOD PRESSURE: 112 MMHG | BODY MASS INDEX: 42.69 KG/M2 | HEIGHT: 62 IN | OXYGEN SATURATION: 95 % | RESPIRATION RATE: 16 BRPM | DIASTOLIC BLOOD PRESSURE: 60 MMHG | HEART RATE: 81 BPM

## 2024-02-02 DIAGNOSIS — F41.9 ANXIETY: ICD-10-CM

## 2024-02-02 DIAGNOSIS — E55.9 VITAMIN D DEFICIENCY: ICD-10-CM

## 2024-02-02 DIAGNOSIS — I10 PRIMARY HYPERTENSION: ICD-10-CM

## 2024-02-02 DIAGNOSIS — Z00.00 ANNUAL PHYSICAL EXAM: Primary | ICD-10-CM

## 2024-02-02 DIAGNOSIS — Z23 NEED FOR INFLUENZA VACCINATION: ICD-10-CM

## 2024-02-02 RX ORDER — LOSARTAN POTASSIUM 100 MG/1
100 TABLET ORAL DAILY
Qty: 90 TABLET | Refills: 2 | Status: SHIPPED | OUTPATIENT
Start: 2024-02-02 | End: 2024-10-29

## 2024-02-02 RX ORDER — AMLODIPINE BESYLATE 5 MG/1
5 TABLET ORAL DAILY
Qty: 30 TABLET | Refills: 0 | OUTPATIENT
Start: 2024-02-02

## 2024-02-02 RX ORDER — AMLODIPINE BESYLATE 5 MG/1
5 TABLET ORAL DAILY
Qty: 90 TABLET | Refills: 2 | Status: SHIPPED | OUTPATIENT
Start: 2024-02-02 | End: 2024-10-29

## 2024-02-02 RX ORDER — CITALOPRAM 20 MG/1
20 TABLET ORAL DAILY
Qty: 90 TABLET | Refills: 2 | Status: SHIPPED | OUTPATIENT
Start: 2024-02-02 | End: 2024-10-29

## 2024-02-02 NOTE — PROGRESS NOTES
Chief Complaint  Hypertension and Annual Exam    Subjective          Christin presents to [facility] as a 53-year-old female for an annual adult preventative physical exam. Overall she feels well. Problem list, medication list, and PMFSH have been reviewed. All recent labs(if applicable) and prescription refills(if needed) have been addressed during today's office visit.  The following were discussed during today's preventative wellness exam: Nutrition, Physical activity, Healthy weight, Immunizations, and Screenings        She  has been feeling well.     Health Habits:  Dental Exam. needs to schedule  Eye Exam. needs to schedule  Exercise: 3 times/week.  Current exercise activities include: walking     Diet/exercise: BMI   Tries to be consistent with a healthy diet.  Tries to remain active.     Social history:smokes 1/2 ppd smoker social alcohol use no drug use     Sexual history: No concern for STD testing;      Sleep:no snoring or gasping  No concerns for sleep apnea.family history of WINIFRED-no.       Tdap: up to date  Zoster: recent shingles outbreak-  will schedule  Colonoscopy: declined  Influenza-ordered today    Anxiety depression seems to be controlled well with Celexa.  Denies any medication side effects.  Plans to continue current dose.  Not currently in counseling.  Denies any SI or HI    PHQ-2 Depression Screening  Little interest or pleasure in doing things?  0   Feeling down, depressed, or hopeless?  0   PHQ-2 Total Score  0     Hypertension-has been controlled on current medication.  She denies any increase or changes in headaches no blurred vision no dizziness no flushing no chest pain or pressure    She has no other acute complaints at today    The following portions of the patient's history were reviewed and updated as appropriate: allergies, current medications, past family history, past medical history, past social history, past surgical history, and problem  "list      Review of Systems   Constitutional:  Negative for chills, fatigue and fever.   Eyes:  Negative for visual disturbance.   Respiratory:  Negative for cough, shortness of breath and wheezing.    Cardiovascular:  Negative for chest pain, palpitations and leg swelling.   Gastrointestinal:  Negative for abdominal pain, diarrhea, nausea and vomiting.   Skin:  Negative for rash.   Neurological:  Negative for dizziness and light-headedness.   Psychiatric/Behavioral:  Negative for self-injury, sleep disturbance and suicidal ideas. The patient is not nervous/anxious.         Objective   Vital Signs:   Vitals:    02/02/24 1419   BP: 112/60   Pulse: 81   Resp: 16   SpO2: 95%   Weight: 105 kg (232 lb)   Height: 157.5 cm (62\")                  Physical Exam  Constitutional:       General: She is not in acute distress.     Appearance: Normal appearance.   HENT:      Head: Normocephalic.      Right Ear: Tympanic membrane, ear canal and external ear normal. There is no impacted cerumen.      Left Ear: Tympanic membrane, ear canal and external ear normal. There is no impacted cerumen.      Nose: Nose normal. No congestion.      Mouth/Throat:      Mouth: Mucous membranes are moist.      Pharynx: Oropharynx is clear. No oropharyngeal exudate or posterior oropharyngeal erythema.   Eyes:      Extraocular Movements: Extraocular movements intact.      Conjunctiva/sclera: Conjunctivae normal.      Pupils: Pupils are equal, round, and reactive to light.   Cardiovascular:      Rate and Rhythm: Normal rate and regular rhythm.      Pulses: Normal pulses.      Heart sounds: Normal heart sounds. No murmur heard.  Pulmonary:      Effort: Pulmonary effort is normal. No respiratory distress.      Breath sounds: Normal breath sounds. No stridor. No wheezing, rhonchi or rales.   Chest:      Chest wall: No tenderness.   Abdominal:      General: Abdomen is flat. Bowel sounds are normal. There is no distension.      Palpations: Abdomen is soft. " There is no mass.      Tenderness: There is no abdominal tenderness. There is no right CVA tenderness, left CVA tenderness, guarding or rebound.      Hernia: No hernia is present.   Musculoskeletal:         General: Normal range of motion.      Cervical back: Normal range of motion and neck supple.   Lymphadenopathy:      Cervical: No cervical adenopathy.   Skin:     General: Skin is warm.      Capillary Refill: Capillary refill takes less than 2 seconds.   Neurological:      Mental Status: She is alert and oriented to person, place, and time. Mental status is at baseline.   Psychiatric:         Mood and Affect: Mood normal.         Behavior: Behavior normal.         Thought Content: Thought content normal.         Judgment: Judgment normal.          Result Review :     The following data was reviewed by: JT Brady on 02/02/2024:      Comprehensive Metabolic Panel (10/10/2022 09:02)  CBC & Differential (10/10/2022 09:02)  Lipid Panel (10/10/2022 09:02)  TSH (10/10/2022 09:02)  T4, Free (10/10/2022 09:02)  Vitamin D 25 Hydroxy (10/10/2022 09:02)       Liver enzymes elevated-avoid alcohol and Tylenol recheck 3 months  Vitamin D is low we will send weekly supplementation to pharmacy     Potassium is slightly elevated-blood test show some hemolysis we will repeat when checking liver enzyme  Assessment and Plan    Diagnoses and all orders for this visit:    1. Annual physical exam (Primary)  Comments:  Health maintenance and immunizations reviewed and updated      Low sodium diet  Exercise 30 minutes most days of the week  Make sure you get results on any labs or tests we ordered today  We discussed medications and how to take them as prescribed  Sleep 6-8 hours each night if possible  If you have not signed up for [employer], please activate your code ASAP from your After Visit Summary today     LDL goal <100  LDL goal if heart disease <70  HDL goal >60  Triglyceride goal <150  BP goal =<130/80  Fasting  glucose <100    2. Primary hypertension  Comments:  controlled-  continue current managment  Monitor blood pressure at home bring log to next visit    Orders:  -     amLODIPine (NORVASC) 5 MG tablet; Take 1 tablet by mouth Daily for 270 days.  Dispense: 90 tablet; Refill: 2  -     losartan (COZAAR) 100 MG tablet; Take 1 tablet by mouth Daily for 270 days.  Dispense: 90 tablet; Refill: 2  -     Comprehensive Metabolic Panel  -     CBC & Differential  -     Lipid Panel  -     T4, Free  -     TSH  -     Vitamin D,25-Hydroxy    3. Anxiety  Comments:  controlled continue current managment  improved-  doing well on current dose  Denies any Si/HI  phq2-0  Orders:  -     citalopram (CeleXA) 20 MG tablet; Take 1 tablet by mouth Daily for 270 days.  Dispense: 90 tablet; Refill: 2  -     Comprehensive Metabolic Panel  -     CBC & Differential  -     Lipid Panel  -     T4, Free  -     TSH  -     Vitamin D,25-Hydroxy    4. Vitamin D deficiency  Comments:  continue weekly supplements-  recheck with next labs  Orders:  -     Comprehensive Metabolic Panel  -     CBC & Differential  -     Lipid Panel  -     T4, Free  -     TSH  -     Vitamin D,25-Hydroxy  -     Cholecalciferol 1.25 MG (27352 UT) tablet; One PO once weekly  Dispense: 5 tablet; Refill: 11    5. Need for influenza vaccination  Comments:  Influenza vaccine in office today  Orders:  -     Fluzone (or Fluarix & Flulaval for VFC) >6mos        Follow Up   Return if symptoms worsen or fail to improve.  Patient was given instructions and counseling regarding her condition or for health maintenance advice. Please see specific information pulled into the AVS if appropriate.

## 2024-02-02 NOTE — PROGRESS NOTES
"Chief Complaint  Hypertension    Subjective     {Problem List  Visit Diagnosis  Review (Popup)  Encounters  Notes  Medications  Labs  Result Review Imaging  Media :23}     Christin presents to [facility]             Objective   Vital Signs:   Vitals:    02/02/24 1419   BP: 112/60   Pulse: 81   Resp: 16   SpO2: 95%   Weight: 105 kg (232 lb)   Height: 157.5 cm (62\")                  Physical Exam   {DIABETIC FOOT EXAM FOR  (Optional):98491::\"Diabetic Foot Exam Performed\":0}  Result Review :{Labs  Result Review  Imaging  Med Tab  Media :23}     {The following data was reviewed by (Optional):78020}           Assessment and Plan {Wrapup  Problem List  Visit Diagnosis  ROS  Review (Popup)  Health Maintenance  Quality  BestPractice  Medications  SmartSets  SnapShot Encounters  Media :23}   There are no diagnoses linked to this encounter.  {Time Spent (Optional):44462}  Follow Up {Wrapup  Review (Popup  Communications :23}  No follow-ups on file.  Patient was given instructions and counseling regarding her condition or for health maintenance advice. Please see specific information pulled into the AVS if appropriate.   "

## 2024-10-11 ENCOUNTER — PATIENT ROUNDING (BHMG ONLY) (OUTPATIENT)
Age: 54
End: 2024-10-11
Payer: COMMERCIAL

## 2024-10-11 NOTE — ED NOTES
Thank you for letting us care for you in your recent visit to our [facility] urgent care center. We would love to hear about your experience with us. Was this the first time you have visited our location?    We’re always looking for ways to make our patients’ experiences even better. Do you have any recommendations on ways we may improve?     I appreciate you taking the time to respond. Please be on the lookout for a survey about your recent visit from [facility] [employer] [facility] [employer] via text or email. We would greatly appreciate if you could fill that out and turn it back in. We want your voice to be heard and we value your feedback.   Thank you for choosing [facility] for your healthcare needs.     If you have concerns or would like to speak to me in person regarding your visit please feel free to give me a call. [phone]    Hope you get well soon and thank you.    Farshad Hill LPN Practice Manager

## 2024-11-21 DIAGNOSIS — F41.9 ANXIETY: ICD-10-CM

## 2024-11-21 DIAGNOSIS — I10 PRIMARY HYPERTENSION: ICD-10-CM

## 2024-11-21 RX ORDER — LOSARTAN POTASSIUM 100 MG/1
100 TABLET ORAL DAILY
Qty: 90 TABLET | Refills: 0 | Status: SHIPPED | OUTPATIENT
Start: 2024-11-21

## 2024-11-21 RX ORDER — AMLODIPINE BESYLATE 5 MG/1
5 TABLET ORAL DAILY
Qty: 90 TABLET | Refills: 2 | Status: SHIPPED | OUTPATIENT
Start: 2024-11-21

## 2024-11-21 RX ORDER — CITALOPRAM HYDROBROMIDE 20 MG/1
20 TABLET ORAL DAILY
Qty: 90 TABLET | Refills: 0 | Status: SHIPPED | OUTPATIENT
Start: 2024-11-21

## 2024-11-21 NOTE — TELEPHONE ENCOUNTER
Rx Refill Note  Requested Prescriptions     Pending Prescriptions Disp Refills    citalopram (CeleXA) 20 MG tablet [Pharmacy Med Name: CITALOPRAM HBR 20 MG TABLET] 90 tablet 2     Sig: TAKE 1 TABLET BY MOUTH DAILY    losartan (COZAAR) 100 MG tablet [Pharmacy Med Name: LOSARTAN POTASSIUM 100 MG TAB] 90 tablet 2     Sig: TAKE 1 TABLET BY MOUTH DAILY    amLODIPine (NORVASC) 5 MG tablet [Pharmacy Med Name: AMLODIPINE BESYLATE 5 MG TAB] 90 tablet 2     Sig: TAKE 1 TABLET BY MOUTH DAILY      Last office visit with prescribing clinician: 2/2/2024   Last telemedicine visit with prescribing clinician: Visit date not found   Next office visit with prescribing clinician: Visit date not found                         Would you like a call back once the refill request has been completed: [] Yes [] No    If the office needs to give you a call back, can they leave a voicemail: [] Yes [] No    Abraham Good MA  11/21/24, 15:05 EST

## 2025-02-19 DIAGNOSIS — F41.9 ANXIETY: ICD-10-CM

## 2025-02-19 DIAGNOSIS — I10 PRIMARY HYPERTENSION: ICD-10-CM

## 2025-02-19 DIAGNOSIS — E55.9 VITAMIN D DEFICIENCY: ICD-10-CM

## 2025-02-20 RX ORDER — LOSARTAN POTASSIUM 100 MG/1
100 TABLET ORAL DAILY
Qty: 30 TABLET | Refills: 0 | Status: SHIPPED | OUTPATIENT
Start: 2025-02-20

## 2025-02-20 RX ORDER — CITALOPRAM HYDROBROMIDE 20 MG/1
20 TABLET ORAL DAILY
Qty: 30 TABLET | Refills: 0 | Status: SHIPPED | OUTPATIENT
Start: 2025-02-20

## 2025-02-20 RX ORDER — CHOLECALCIFEROL (VITAMIN D3) 1250 MCG
CAPSULE ORAL
Qty: 4 CAPSULE | Refills: 0 | Status: SHIPPED | OUTPATIENT
Start: 2025-02-20

## 2025-03-23 DIAGNOSIS — I10 PRIMARY HYPERTENSION: ICD-10-CM

## 2025-03-23 DIAGNOSIS — F41.9 ANXIETY: ICD-10-CM

## 2025-03-24 NOTE — TELEPHONE ENCOUNTER
Rx Refill Note  Requested Prescriptions     Pending Prescriptions Disp Refills    citalopram (CeleXA) 20 MG tablet [Pharmacy Med Name: CITALOPRAM HBR 20 MG TABLET] 30 tablet 0     Sig: TAKE 1 TABLET BY MOUTH DAILY    losartan (COZAAR) 100 MG tablet [Pharmacy Med Name: LOSARTAN POTASSIUM 100 MG TAB] 30 tablet 0     Sig: TAKE 1 TABLET BY MOUTH DAILY      Last office visit with prescribing clinician: 2/2/2024   Last telemedicine visit with prescribing clinician: Visit date not found   Next office visit with prescribing clinician: 4/4/2025                         Would you like a call back once the refill request has been completed: [] Yes [] No    If the office needs to give you a call back, can they leave a voicemail: [] Yes [] No    Abraham Good MA  03/24/25, 08:49 EDT

## 2025-03-25 RX ORDER — LOSARTAN POTASSIUM 100 MG/1
100 TABLET ORAL DAILY
Qty: 30 TABLET | Refills: 0 | Status: SHIPPED | OUTPATIENT
Start: 2025-03-25

## 2025-03-25 RX ORDER — CITALOPRAM HYDROBROMIDE 20 MG/1
20 TABLET ORAL DAILY
Qty: 30 TABLET | Refills: 0 | Status: SHIPPED | OUTPATIENT
Start: 2025-03-25

## 2025-04-21 ENCOUNTER — OFFICE VISIT (OUTPATIENT)
Dept: FAMILY MEDICINE CLINIC | Facility: CLINIC | Age: 55
End: 2025-04-21
Payer: COMMERCIAL

## 2025-04-21 VITALS
TEMPERATURE: 99 F | WEIGHT: 217 LBS | HEART RATE: 86 BPM | OXYGEN SATURATION: 96 % | BODY MASS INDEX: 39.93 KG/M2 | SYSTOLIC BLOOD PRESSURE: 112 MMHG | DIASTOLIC BLOOD PRESSURE: 80 MMHG | HEIGHT: 62 IN

## 2025-04-21 DIAGNOSIS — F41.9 ANXIETY: ICD-10-CM

## 2025-04-21 DIAGNOSIS — Z12.31 ENCOUNTER FOR SCREENING MAMMOGRAM FOR MALIGNANT NEOPLASM OF BREAST: ICD-10-CM

## 2025-04-21 DIAGNOSIS — E55.9 VITAMIN D DEFICIENCY: ICD-10-CM

## 2025-04-21 DIAGNOSIS — I10 PRIMARY HYPERTENSION: ICD-10-CM

## 2025-04-21 DIAGNOSIS — Z00.00 ANNUAL PHYSICAL EXAM: Primary | ICD-10-CM

## 2025-04-21 DIAGNOSIS — F17.200 TOBACCO DEPENDENCE: ICD-10-CM

## 2025-04-21 PROCEDURE — 99396 PREV VISIT EST AGE 40-64: CPT | Performed by: NURSE PRACTITIONER

## 2025-04-21 RX ORDER — LOSARTAN POTASSIUM 100 MG/1
100 TABLET ORAL DAILY
Qty: 90 TABLET | Refills: 2 | Status: SHIPPED | OUTPATIENT
Start: 2025-04-21

## 2025-04-21 RX ORDER — AMLODIPINE BESYLATE 5 MG/1
5 TABLET ORAL DAILY
Qty: 90 TABLET | Refills: 2 | Status: SHIPPED | OUTPATIENT
Start: 2025-04-21

## 2025-04-21 RX ORDER — CITALOPRAM HYDROBROMIDE 20 MG/1
20 TABLET ORAL DAILY
Qty: 90 TABLET | Refills: 2 | Status: SHIPPED | OUTPATIENT
Start: 2025-04-21

## 2025-04-21 NOTE — PROGRESS NOTES
Chief Complaint  Annual Exam    Subjective        HPI   Christin presents to [facility] for  an annual adult preventative physical exam. Overall she feels well. Problem list, medication list, and PMFSH have been reviewed. All recent labs(if applicable) and prescription refills(if needed) have been addressed during today's office visit.  The following were discussed during today's preventative wellness exam: Nutrition, Physical activity, Healthy weight, Immunizations, and Screenings       She  has been feeling well.     Health Habits:  Dental Exam. not up to date - needs to schedule  Eye Exam. up to date  Exercise: 0 times/week.  Current exercise activities include: none     Diet/exercise: BMI 39.6  Tries to be consistent with a healthy diet.  Tries to remain active.     Social history:  1 ppd smoker social alcohol use no drug use     Sexual history: No concern for STD testing;      Sleep:no snoring or gasping  No concerns for sleep apnea.family history of WINIFRED-no.       Tdap: declined  Zoster:declined  Colonoscopy: declined  Mammogram- ordered  Low dose CT scan- ordered      Hypertension-has been well-controlled on current medication.  She takes her medication as directed and denies any side effects  No increase or changes in headaches no blurred vision no dizziness no flushing no chest pain or pressure    Anxiety seems to be well-controlled on Celexa.  Plans to continue current dose.  Denies any side effects.  Denies any current SI or HI.  Not currently in counseling.    She is agreeable to mammogram and CT lung screening  Declined smoking cessation    She is not fasting today and will return for labs    No other acute complaints    The following portions of the patient's history were reviewed and updated as appropriate: allergies, current medications, past family history, past medical history, past social history, past surgical history, and problem list     Review of Systems  "  Constitutional:  Negative for chills, fatigue and fever.   Eyes:  Negative for visual disturbance.   Respiratory:  Negative for cough, shortness of breath and wheezing.    Cardiovascular:  Negative for chest pain and leg swelling.   Gastrointestinal:  Negative for abdominal pain, diarrhea, nausea and vomiting.   Endocrine: Negative for cold intolerance, heat intolerance, polydipsia, polyphagia and polyuria.   Genitourinary:  Negative for dysuria.   Neurological:  Negative for dizziness.   Hematological:  Negative for adenopathy. Does not bruise/bleed easily.   Psychiatric/Behavioral:  Negative for self-injury, sleep disturbance and suicidal ideas. The patient is not nervous/anxious.         Objective   Vital Signs:   Vitals:    04/21/25 1510   BP: 112/80   BP Location: Left arm   Patient Position: Sitting   Cuff Size: Adult   Pulse: 86   Temp: 99 °F (37.2 °C)   TempSrc: Oral   SpO2: 96%   Weight: 98.4 kg (217 lb)   Height: 157.5 cm (62.01\")   PainSc: 0-No pain            4/21/2025     3:12 PM   PHQ-2/PHQ-9 Depression Screening   Little interest or pleasure in doing things Not at all   Feeling down, depressed, or hopeless Not at all   How difficult have these problems made it for you to do your work, take care of things at home, or get along with other people? Not difficult at all       Class 2 Severe Obesity (BMI >=35 and <=39.9). Obesity-related health conditions include the following: hypertension. Obesity is unchanged. BMI is is above average; BMI management plan is completed. We discussed portion control and increasing exercise.        Physical Exam  Constitutional:       General: She is not in acute distress.     Appearance: Normal appearance.   HENT:      Head: Normocephalic.      Right Ear: Tympanic membrane, ear canal and external ear normal. There is no impacted cerumen.      Left Ear: Tympanic membrane, ear canal and external ear normal. There is no impacted cerumen.      Nose: Nose normal. No " congestion.      Mouth/Throat:      Mouth: Mucous membranes are moist.      Pharynx: Oropharynx is clear. No oropharyngeal exudate or posterior oropharyngeal erythema.   Eyes:      Extraocular Movements: Extraocular movements intact.      Conjunctiva/sclera: Conjunctivae normal.      Pupils: Pupils are equal, round, and reactive to light.   Cardiovascular:      Rate and Rhythm: Normal rate and regular rhythm.      Pulses: Normal pulses.      Heart sounds: Normal heart sounds. No murmur heard.  Pulmonary:      Effort: Pulmonary effort is normal. No respiratory distress.      Breath sounds: Normal breath sounds. No stridor. No wheezing, rhonchi or rales.   Chest:      Chest wall: No tenderness.   Abdominal:      General: Abdomen is flat. Bowel sounds are normal. There is no distension.      Palpations: Abdomen is soft. There is no mass.      Tenderness: There is no abdominal tenderness. There is no right CVA tenderness, left CVA tenderness, guarding or rebound.      Hernia: No hernia is present.   Musculoskeletal:         General: Normal range of motion.      Cervical back: Normal range of motion and neck supple.   Lymphadenopathy:      Cervical: No cervical adenopathy.   Skin:     General: Skin is warm.      Capillary Refill: Capillary refill takes less than 2 seconds.   Neurological:      Mental Status: She is alert and oriented to person, place, and time. Mental status is at baseline.   Psychiatric:         Mood and Affect: Mood normal.         Behavior: Behavior normal.         Thought Content: Thought content normal.         Judgment: Judgment normal.          Result Review :     The following data was reviewed by: JT Brady on 04/21/2025:           Assessment and Plan       Annual physical exam  Healthy well-balanced diet  Exercise 30 minutes most days of the week  Make sure you get results on any labs or tests we ordered today  We discussed medications and how to take them as prescribed  Sleep 6-8  hours each night if possible  If you have not signed up for [employer], please activate your code ASAP from your After Visit Summary today     LDL goal <100  LDL goal if heart disease <70  HDL goal >60  Triglyceride goal <150  BP goal =<130/80  Fasting glucose <100         Primary hypertension  Hypertension is stable and controlled  Continue current treatment regimen.  Blood pressure will be reassessed in 6 months.    Orders:    Vitamin D,25-Hydroxy    TSH    T4, Free    Lipid Panel    CBC & Differential    Comprehensive Metabolic Panel    losartan (COZAAR) 100 MG tablet; Take 1 tablet by mouth Daily.    amLODIPine (NORVASC) 5 MG tablet; Take 1 tablet by mouth Daily.    Anxiety  Stable-continue current management  Orders:    Vitamin D,25-Hydroxy    TSH    T4, Free    Lipid Panel    CBC & Differential    Comprehensive Metabolic Panel    citalopram (CeleXA) 20 MG tablet; Take 1 tablet by mouth Daily.    Vitamin D deficiency  Recheck vitamin D with labs  Orders:    Vitamin D,25-Hydroxy    TSH    T4, Free    Lipid Panel    CBC & Differential    Comprehensive Metabolic Panel    Encounter for screening mammogram for malignant neoplasm of breast  Mammogram ordered today screening only  Orders:    Mammo screening digital tomosynthesis bilateral w CAD; Future    Tobacco dependence  Tobacco use is unchanged.  Smoking cessation counseling was provided.  Tobacco use will be reassessed in 6 months.        Orders:    CT Chest Low Dose Wo; Future          Follow Up   Return in about 6 months (around 10/21/2025) for Next scheduled follow up.  Patient was given instructions and counseling regarding her condition or for health maintenance advice. Please see specific information pulled into the AVS if appropriate.

## 2025-04-22 DIAGNOSIS — F41.9 ANXIETY: ICD-10-CM

## 2025-04-22 DIAGNOSIS — I10 PRIMARY HYPERTENSION: ICD-10-CM

## 2025-04-22 RX ORDER — CITALOPRAM HYDROBROMIDE 20 MG/1
20 TABLET ORAL DAILY
Qty: 30 TABLET | OUTPATIENT
Start: 2025-04-22

## 2025-04-22 RX ORDER — LOSARTAN POTASSIUM 100 MG/1
100 TABLET ORAL DAILY
Qty: 30 TABLET | OUTPATIENT
Start: 2025-04-22

## 2025-04-24 DIAGNOSIS — E55.9 VITAMIN D DEFICIENCY: ICD-10-CM

## 2025-04-24 RX ORDER — CHOLECALCIFEROL (VITAMIN D3) 1250 MCG
50000 CAPSULE ORAL WEEKLY
Qty: 4 CAPSULE | Refills: 0 | Status: SHIPPED | OUTPATIENT
Start: 2025-04-24

## 2025-06-17 ENCOUNTER — RESULTS FOLLOW-UP (OUTPATIENT)
Dept: FAMILY MEDICINE CLINIC | Facility: CLINIC | Age: 55
End: 2025-06-17
Payer: COMMERCIAL

## 2025-06-17 ENCOUNTER — HOSPITAL ENCOUNTER (OUTPATIENT)
Dept: CT IMAGING | Facility: HOSPITAL | Age: 55
Discharge: HOME | End: 2025-06-17
Payer: COMMERCIAL

## 2025-06-17 ENCOUNTER — HOSPITAL ENCOUNTER (OUTPATIENT)
Dept: MAMMOGRAPHY | Facility: HOSPITAL | Age: 55
Discharge: HOME | End: 2025-06-17
Payer: COMMERCIAL

## 2025-06-17 DIAGNOSIS — F17.200 TOBACCO DEPENDENCE: ICD-10-CM

## 2025-06-17 DIAGNOSIS — Z12.31 ENCOUNTER FOR SCREENING MAMMOGRAM FOR MALIGNANT NEOPLASM OF BREAST: ICD-10-CM

## 2025-06-17 PROCEDURE — 71271 CT THORAX LUNG CANCER SCR C-: CPT

## 2025-06-17 PROCEDURE — 77063 BREAST TOMOSYNTHESIS BI: CPT

## 2025-06-17 PROCEDURE — 77067 SCR MAMMO BI INCL CAD: CPT

## 2025-06-17 NOTE — LETTER
Christin Llanos  [address] [facility] [address]    June 17, 2025     Dear Ms. Llanos:    Below are the results from your recent visit:    Resulted Orders   Mammo Screening Digital Tomosynthesis Bilateral With CAD    Narrative    MAMMO SCREENING DIGITAL TOMOSYNTHESIS BILATERAL W CAD-     CLINICAL INDICATION: 54 years old female presents for annual screening  mammogram.        TECHNIQUE: 2-D and tomosynthesis MLO and CC views of the breast(s) were  obtained     FINDINGS:     BREAST  DENSITY: There are scattered areas of fibroglandular density.     There are no suspicious masses, calcifications, or areas of  architectural distortion.     IMPRESSION/RECOMMENDATION(S):     Negative mammogram showing no change from 11/10/2022.     Recommend annual screening mammogram in one year.     BI-RADS Category 1. Negative.           This report was finalized on 6/17/2025 3:02 PM by Dr. Henry Allison M.D  on Workstation: [id number]          Mammogram negative- repeat in 1 year     If you have any questions or concerns, please don't hesitate to call.         Sincerely,        JT Brady

## 2025-07-22 DIAGNOSIS — E55.9 VITAMIN D DEFICIENCY: ICD-10-CM

## 2025-07-22 RX ORDER — CHOLECALCIFEROL (VITAMIN D3) 1250 MCG
50000 CAPSULE ORAL WEEKLY
Qty: 4 CAPSULE | Refills: 0 | Status: SHIPPED | OUTPATIENT
Start: 2025-07-22